# Patient Record
Sex: FEMALE | Race: WHITE | NOT HISPANIC OR LATINO | URBAN - METROPOLITAN AREA
[De-identification: names, ages, dates, MRNs, and addresses within clinical notes are randomized per-mention and may not be internally consistent; named-entity substitution may affect disease eponyms.]

---

## 2021-05-20 ENCOUNTER — INPATIENT (INPATIENT)
Facility: HOSPITAL | Age: 26
LOS: 1 days | Discharge: HOME | End: 2021-05-22
Attending: OBSTETRICS & GYNECOLOGY | Admitting: OBSTETRICS & GYNECOLOGY
Payer: MEDICAID

## 2021-05-20 VITALS
TEMPERATURE: 96 F | SYSTOLIC BLOOD PRESSURE: 138 MMHG | OXYGEN SATURATION: 100 % | DIASTOLIC BLOOD PRESSURE: 78 MMHG | RESPIRATION RATE: 18 BRPM | WEIGHT: 179.9 LBS | HEART RATE: 96 BPM

## 2021-05-20 LAB
ALBUMIN SERPL ELPH-MCNC: 4.4 G/DL — SIGNIFICANT CHANGE UP (ref 3.5–5.2)
ALP SERPL-CCNC: 91 U/L — SIGNIFICANT CHANGE UP (ref 30–115)
ALT FLD-CCNC: 11 U/L — SIGNIFICANT CHANGE UP (ref 0–41)
ANION GAP SERPL CALC-SCNC: 11 MMOL/L — SIGNIFICANT CHANGE UP (ref 7–14)
APPEARANCE UR: CLEAR — SIGNIFICANT CHANGE UP
AST SERPL-CCNC: 13 U/L — SIGNIFICANT CHANGE UP (ref 0–41)
BASOPHILS # BLD AUTO: 0.02 K/UL — SIGNIFICANT CHANGE UP (ref 0–0.2)
BASOPHILS NFR BLD AUTO: 0.2 % — SIGNIFICANT CHANGE UP (ref 0–1)
BILIRUB SERPL-MCNC: 0.3 MG/DL — SIGNIFICANT CHANGE UP (ref 0.2–1.2)
BILIRUB UR-MCNC: NEGATIVE — SIGNIFICANT CHANGE UP
BUN SERPL-MCNC: 8 MG/DL — LOW (ref 10–20)
CALCIUM SERPL-MCNC: 9.2 MG/DL — SIGNIFICANT CHANGE UP (ref 8.5–10.1)
CHLORIDE SERPL-SCNC: 103 MMOL/L — SIGNIFICANT CHANGE UP (ref 98–110)
CO2 SERPL-SCNC: 24 MMOL/L — SIGNIFICANT CHANGE UP (ref 17–32)
COLOR SPEC: SIGNIFICANT CHANGE UP
CREAT SERPL-MCNC: 0.7 MG/DL — SIGNIFICANT CHANGE UP (ref 0.7–1.5)
DIFF PNL FLD: NEGATIVE — SIGNIFICANT CHANGE UP
EOSINOPHIL # BLD AUTO: 0.01 K/UL — SIGNIFICANT CHANGE UP (ref 0–0.7)
EOSINOPHIL NFR BLD AUTO: 0.1 % — SIGNIFICANT CHANGE UP (ref 0–8)
GLUCOSE SERPL-MCNC: 117 MG/DL — HIGH (ref 70–99)
GLUCOSE UR QL: NEGATIVE — SIGNIFICANT CHANGE UP
HCT VFR BLD CALC: 33.2 % — LOW (ref 37–47)
HGB BLD-MCNC: 10.3 G/DL — LOW (ref 12–16)
IMM GRANULOCYTES NFR BLD AUTO: 0.3 % — SIGNIFICANT CHANGE UP (ref 0.1–0.3)
KETONES UR-MCNC: ABNORMAL
LEUKOCYTE ESTERASE UR-ACNC: NEGATIVE — SIGNIFICANT CHANGE UP
LIDOCAIN IGE QN: 26 U/L — SIGNIFICANT CHANGE UP (ref 7–60)
LYMPHOCYTES # BLD AUTO: 1.4 K/UL — SIGNIFICANT CHANGE UP (ref 1.2–3.4)
LYMPHOCYTES # BLD AUTO: 15.4 % — LOW (ref 20.5–51.1)
MCHC RBC-ENTMCNC: 23 PG — LOW (ref 27–31)
MCHC RBC-ENTMCNC: 31 G/DL — LOW (ref 32–37)
MCV RBC AUTO: 74.1 FL — LOW (ref 81–99)
MONOCYTES # BLD AUTO: 0.35 K/UL — SIGNIFICANT CHANGE UP (ref 0.1–0.6)
MONOCYTES NFR BLD AUTO: 3.9 % — SIGNIFICANT CHANGE UP (ref 1.7–9.3)
NEUTROPHILS # BLD AUTO: 7.28 K/UL — HIGH (ref 1.4–6.5)
NEUTROPHILS NFR BLD AUTO: 80.1 % — HIGH (ref 42.2–75.2)
NITRITE UR-MCNC: NEGATIVE — SIGNIFICANT CHANGE UP
NRBC # BLD: 0 /100 WBCS — SIGNIFICANT CHANGE UP (ref 0–0)
PH UR: 6.5 — SIGNIFICANT CHANGE UP (ref 5–8)
PLATELET # BLD AUTO: 197 K/UL — SIGNIFICANT CHANGE UP (ref 130–400)
POTASSIUM SERPL-MCNC: 4.5 MMOL/L — SIGNIFICANT CHANGE UP (ref 3.5–5)
POTASSIUM SERPL-SCNC: 4.5 MMOL/L — SIGNIFICANT CHANGE UP (ref 3.5–5)
PROT SERPL-MCNC: 6.9 G/DL — SIGNIFICANT CHANGE UP (ref 6–8)
PROT UR-MCNC: SIGNIFICANT CHANGE UP
RBC # BLD: 4.48 M/UL — SIGNIFICANT CHANGE UP (ref 4.2–5.4)
RBC # FLD: 16.8 % — HIGH (ref 11.5–14.5)
SODIUM SERPL-SCNC: 138 MMOL/L — SIGNIFICANT CHANGE UP (ref 135–146)
SP GR SPEC: 1.02 — SIGNIFICANT CHANGE UP (ref 1.01–1.03)
UROBILINOGEN FLD QL: SIGNIFICANT CHANGE UP
WBC # BLD: 9.09 K/UL — SIGNIFICANT CHANGE UP (ref 4.8–10.8)
WBC # FLD AUTO: 9.09 K/UL — SIGNIFICANT CHANGE UP (ref 4.8–10.8)

## 2021-05-20 PROCEDURE — 99285 EMERGENCY DEPT VISIT HI MDM: CPT

## 2021-05-20 RX ORDER — IOHEXOL 300 MG/ML
30 INJECTION, SOLUTION INTRAVENOUS ONCE
Refills: 0 | Status: COMPLETED | OUTPATIENT
Start: 2021-05-20 | End: 2021-05-20

## 2021-05-20 RX ADMIN — IOHEXOL 30 MILLILITER(S): 300 INJECTION, SOLUTION INTRAVENOUS at 21:03

## 2021-05-20 NOTE — ED PROVIDER NOTE - PROGRESS NOTE DETAILS
After discussion CT results with Dr. Hammond, US ordered and GYN consult called, Pt evaluated by GYN and US results appreciated. GYN attending Dr. Agustin at bedside with pt and mother for evaluation discussion options at this time. Will follow. Pt in NAD.

## 2021-05-20 NOTE — ED PROVIDER NOTE - CLINICAL SUMMARY MEDICAL DECISION MAKING FREE TEXT BOX
pt evaluated for lower abdominal discomfort, pt CT A/P with large right ovarian cyst, US confirms about 13 cm with ? vascular flow. pt evaluated by GYN, recommend admission to GYN service for intervention under Dr. Agustin

## 2021-05-20 NOTE — ED ADULT TRIAGE NOTE - CHIEF COMPLAINT QUOTE
pt came to ED for right sided abdominal pain that began today after Armond. as per mom, pt gets constipated easily. denies N/V

## 2021-05-20 NOTE — ED PROVIDER NOTE - PHYSICAL EXAMINATION
VITAL SIGNS: noted  CONSTITUTIONAL: Well-developed; well-nourished; in no acute distress  HEAD: Normocephalic; atraumatic  EYES: PERRL, EOM intact; conjunctiva and sclera clear  ENT: No nasal discharge; airway clear. MMM  NECK: Supple; non tender.    CARD: S1, S2 normal; no murmurs, gallops, or rubs. Regular rate and rhythm  RESP: CTAB/L, no wheezes, rales or rhonchi  ABD: Normal bowel sounds; soft; non-distended; +lower abdominal tenderness, no rebound or guarding, no CVA tenderness  EXT: Normal ROM. No calf tenderness or edema. Distal pulses intact  NEURO: Alert, oriented. Grossly unremarkable. No focal deficits  SKIN: Skin exam is warm and dry, no acute rash  MS: No midline spinal tenderness

## 2021-05-20 NOTE — ED PROVIDER NOTE - OBJECTIVE STATEMENT
24 yo female with PMH intellectual disability BIB mother c/o RLQ abdominal pain. Pt came home from school and had pain, mom states she was doing kathrin earlier today. No falls or trauma. Pt urinating as usual; no V/D, fevers or change in appetite. Pt not sexually active. Pt is limited historian.

## 2021-05-20 NOTE — ED ADULT TRIAGE NOTE - MEANS OF ARRIVAL
----- Message from Holly Levin sent at 8/1/2017 12:44 PM CDT -----  Contact: Alejandro   [phone] option [phone] ext [phone]  [place]   -   Research is needed on the pt insurance in order to receive the VGO   Pt starting new therapy on August 2  .  Direction are needed   Pt  Call number  [phone]  Thanks,  
Called and spoke with Alejandro at Research as requested.  She explains that, she is waiting to hear back to see if through his Medicare Part D his Vgo may be covered.  Alejandro explains that, she may not know until 24-48 hours of the results.  She will call the patient and Tiffanie Wilhelm NP to let know of the results.  I explained to Alejandro that patient will have a sample kit for tomorrow with the Diabetes Educator Lisa Feng.  Alejandro verbalizes understanding.   
ambulatory

## 2021-05-21 ENCOUNTER — RESULT REVIEW (OUTPATIENT)
Age: 26
End: 2021-05-21

## 2021-05-21 DIAGNOSIS — Z90.89 ACQUIRED ABSENCE OF OTHER ORGANS: Chronic | ICD-10-CM

## 2021-05-21 LAB
ABO RH CONFIRMATION: SIGNIFICANT CHANGE UP
ANION GAP SERPL CALC-SCNC: 10 MMOL/L — SIGNIFICANT CHANGE UP (ref 7–14)
BASOPHILS # BLD AUTO: 0.01 K/UL — SIGNIFICANT CHANGE UP (ref 0–0.2)
BASOPHILS NFR BLD AUTO: 0.1 % — SIGNIFICANT CHANGE UP (ref 0–1)
BLD GP AB SCN SERPL QL: SIGNIFICANT CHANGE UP
BUN SERPL-MCNC: 6 MG/DL — LOW (ref 10–20)
CALCIUM SERPL-MCNC: 8.9 MG/DL — SIGNIFICANT CHANGE UP (ref 8.5–10.1)
CHLORIDE SERPL-SCNC: 104 MMOL/L — SIGNIFICANT CHANGE UP (ref 98–110)
CO2 SERPL-SCNC: 24 MMOL/L — SIGNIFICANT CHANGE UP (ref 17–32)
CREAT SERPL-MCNC: 0.6 MG/DL — LOW (ref 0.7–1.5)
EOSINOPHIL # BLD AUTO: 0 K/UL — SIGNIFICANT CHANGE UP (ref 0–0.7)
EOSINOPHIL NFR BLD AUTO: 0 % — SIGNIFICANT CHANGE UP (ref 0–8)
GLUCOSE SERPL-MCNC: 122 MG/DL — HIGH (ref 70–99)
HCT VFR BLD CALC: 32.1 % — LOW (ref 37–47)
HGB BLD-MCNC: 10.1 G/DL — LOW (ref 12–16)
IMM GRANULOCYTES NFR BLD AUTO: 0.5 % — HIGH (ref 0.1–0.3)
LDH SERPL L TO P-CCNC: 245 — HIGH (ref 50–242)
LYMPHOCYTES # BLD AUTO: 1 K/UL — LOW (ref 1.2–3.4)
LYMPHOCYTES # BLD AUTO: 9.2 % — LOW (ref 20.5–51.1)
MAGNESIUM SERPL-MCNC: 1.9 MG/DL — SIGNIFICANT CHANGE UP (ref 1.8–2.4)
MCHC RBC-ENTMCNC: 22.8 PG — LOW (ref 27–31)
MCHC RBC-ENTMCNC: 31.5 G/DL — LOW (ref 32–37)
MCV RBC AUTO: 72.5 FL — LOW (ref 81–99)
MONOCYTES # BLD AUTO: 0.54 K/UL — SIGNIFICANT CHANGE UP (ref 0.1–0.6)
MONOCYTES NFR BLD AUTO: 5 % — SIGNIFICANT CHANGE UP (ref 1.7–9.3)
NEUTROPHILS # BLD AUTO: 9.28 K/UL — HIGH (ref 1.4–6.5)
NEUTROPHILS NFR BLD AUTO: 85.2 % — HIGH (ref 42.2–75.2)
NRBC # BLD: 0 /100 WBCS — SIGNIFICANT CHANGE UP (ref 0–0)
PHOSPHATE SERPL-MCNC: 2.8 MG/DL — SIGNIFICANT CHANGE UP (ref 2.1–4.9)
PLATELET # BLD AUTO: 188 K/UL — SIGNIFICANT CHANGE UP (ref 130–400)
POTASSIUM SERPL-MCNC: 4.5 MMOL/L — SIGNIFICANT CHANGE UP (ref 3.5–5)
POTASSIUM SERPL-SCNC: 4.5 MMOL/L — SIGNIFICANT CHANGE UP (ref 3.5–5)
RBC # BLD: 4.43 M/UL — SIGNIFICANT CHANGE UP (ref 4.2–5.4)
RBC # FLD: 16.9 % — HIGH (ref 11.5–14.5)
SARS-COV-2 RNA SPEC QL NAA+PROBE: SIGNIFICANT CHANGE UP
SODIUM SERPL-SCNC: 138 MMOL/L — SIGNIFICANT CHANGE UP (ref 135–146)
WBC # BLD: 10.88 K/UL — HIGH (ref 4.8–10.8)
WBC # FLD AUTO: 10.88 K/UL — HIGH (ref 4.8–10.8)

## 2021-05-21 PROCEDURE — 74177 CT ABD & PELVIS W/CONTRAST: CPT | Mod: 26,MA

## 2021-05-21 PROCEDURE — 88305 TISSUE EXAM BY PATHOLOGIST: CPT | Mod: 26

## 2021-05-21 PROCEDURE — 88342 IMHCHEM/IMCYTCHM 1ST ANTB: CPT | Mod: 26

## 2021-05-21 PROCEDURE — 58662 LAPAROSCOPY EXCISE LESIONS: CPT

## 2021-05-21 PROCEDURE — 99222 1ST HOSP IP/OBS MODERATE 55: CPT

## 2021-05-21 PROCEDURE — 88112 CYTOPATH CELL ENHANCE TECH: CPT | Mod: 26

## 2021-05-21 PROCEDURE — 88341 IMHCHEM/IMCYTCHM EA ADD ANTB: CPT | Mod: 26

## 2021-05-21 PROCEDURE — 76856 US EXAM PELVIC COMPLETE: CPT | Mod: 26

## 2021-05-21 RX ORDER — ACETAMINOPHEN 500 MG
975 TABLET ORAL EVERY 8 HOURS
Refills: 0 | Status: DISCONTINUED | OUTPATIENT
Start: 2021-05-21 | End: 2021-05-22

## 2021-05-21 RX ORDER — CEFAZOLIN SODIUM 1 G
2000 VIAL (EA) INJECTION EVERY 8 HOURS
Refills: 0 | Status: COMPLETED | OUTPATIENT
Start: 2021-05-21 | End: 2021-05-22

## 2021-05-21 RX ORDER — IBUPROFEN 200 MG
600 TABLET ORAL EVERY 6 HOURS
Refills: 0 | Status: DISCONTINUED | OUTPATIENT
Start: 2021-05-21 | End: 2021-05-22

## 2021-05-21 RX ORDER — HYDROMORPHONE HYDROCHLORIDE 2 MG/ML
1 INJECTION INTRAMUSCULAR; INTRAVENOUS; SUBCUTANEOUS
Refills: 0 | Status: DISCONTINUED | OUTPATIENT
Start: 2021-05-21 | End: 2021-05-21

## 2021-05-21 RX ORDER — ONDANSETRON 8 MG/1
4 TABLET, FILM COATED ORAL EVERY 4 HOURS
Refills: 0 | Status: DISCONTINUED | OUTPATIENT
Start: 2021-05-21 | End: 2021-05-22

## 2021-05-21 RX ORDER — HYDROMORPHONE HYDROCHLORIDE 2 MG/ML
0.5 INJECTION INTRAMUSCULAR; INTRAVENOUS; SUBCUTANEOUS
Refills: 0 | Status: DISCONTINUED | OUTPATIENT
Start: 2021-05-21 | End: 2021-05-21

## 2021-05-21 RX ORDER — SIMETHICONE 80 MG/1
80 TABLET, CHEWABLE ORAL EVERY 4 HOURS
Refills: 0 | Status: DISCONTINUED | OUTPATIENT
Start: 2021-05-21 | End: 2021-05-22

## 2021-05-21 RX ORDER — SODIUM CHLORIDE 9 MG/ML
1000 INJECTION, SOLUTION INTRAVENOUS
Refills: 0 | Status: DISCONTINUED | OUTPATIENT
Start: 2021-05-21 | End: 2021-05-21

## 2021-05-21 RX ORDER — ACETAMINOPHEN 500 MG
650 TABLET ORAL ONCE
Refills: 0 | Status: COMPLETED | OUTPATIENT
Start: 2021-05-21 | End: 2021-05-21

## 2021-05-21 RX ORDER — OXYCODONE HYDROCHLORIDE 5 MG/1
10 TABLET ORAL EVERY 4 HOURS
Refills: 0 | Status: DISCONTINUED | OUTPATIENT
Start: 2021-05-21 | End: 2021-05-22

## 2021-05-21 RX ADMIN — Medication 100 MILLIGRAM(S): at 20:54

## 2021-05-21 RX ADMIN — Medication 600 MILLIGRAM(S): at 23:05

## 2021-05-21 NOTE — PROGRESS NOTE ADULT - SUBJECTIVE AND OBJECTIVE BOX
PGY 2 Note    Patient examined at bedside, pain well controlled on PO medications.  Denies fevers/chills, HA/N/V, CP/SOB/palpitations, hematuria/dysuria, constipation/diarrhea. Tolerating regular diet, passing no flatus. Not Ambulating. Using incentive spirometry.     T(F): 98.3 (05-21-21 @ 20:00), Max: 98.7 (05-21-21 @ 14:45)  HR: 83 (05-21-21 @ 21:00) (73 - 84)  BP: 120/69 (05-21-21 @ 21:00) (98/52 - 138/65)  RR: 20 (05-21-21 @ 21:00) (14 - 22)  SpO2: 98% (05-21-21 @ 21:00) (97% - 100%)    I&O's Summary    21 May 2021 07:01  -  21 May 2021 21:07  --------------------------------------------------------  IN: 525 mL / OUT: 350 mL / NET: 175 mL      Urine:   05-21-21 @ 07:01  -  05-21-21 @ 21:07  --------------------------------------------------------  IN: 0 mL / OUT: 350 mL / NET: -350 mL    UO: 130cc clear (1800-200), adequate    Physical Exam:  General: AAOx3. NAD  CVS: RRR. Nl S1S2  Lungs: CTAB  Abdomen: soft, non-tender, non-distended, +BSx4  Incision: laparoscopic surgical dressings C/D/I, no erythema, no draining  VE: deferred, no bleeding on pad/chux  Ext: No edema. SCDs in place    Labs:             10.3<L>  9.09  )-----------( 197      ( 05-20 @ 20:47 )             33.2<L>     05-20    138  |  103  |  8<L>  ----------------------------<  117<H>  4.5   |  24  |  0.7    Ca    9.2      20 May 2021 20:47    TPro  6.9  /  Alb  4.4  /  TBili  0.3  /  DBili  x   /  AST  13  /  ALT  11  /  AlkPhos  91  05-20        Trend:             10.3<L>  9.09  )-----------( 197      ( 05-20 @ 20:47 )             33.2<L>      Creatinine, Serum: 0.7 (05-20)      Medications:  MEDICATIONS  (STANDING):  acetaminophen   Tablet .. 975 milliGRAM(s) Oral every 8 hours  ceFAZolin   IVPB 2000 milliGRAM(s) IV Intermittent every 8 hours  ibuprofen  Tablet. 600 milliGRAM(s) Oral every 6 hours  simethicone 80 milliGRAM(s) Chew every 4 hours    MEDICATIONS  (PRN):  HYDROmorphone  Injectable 0.5 milliGRAM(s) IV Push every 10 minutes PRN Moderate Pain (4 - 6)  HYDROmorphone  Injectable 1 milliGRAM(s) IV Push every 10 minutes PRN Severe Pain (7 - 10)  ondansetron Injectable 4 milliGRAM(s) IV Push every 4 hours PRN Nausea and/or Vomiting  oxyCODONE    IR 10 milliGRAM(s) Oral every 4 hours PRN Severe Pain (7 - 10)             PGY 2 Note    Patient examined at bedside, pain well controlled on PO medications.  Denies fevers/chills, HA/N/V, CP/SOB/palpitations, hematuria/dysuria, constipation/diarrhea. Tolerating regular diet, passing no flatus. Not Ambulating. Using incentive spirometry.     T(F): 98.3 (05-21-21 @ 20:00), Max: 98.7 (05-21-21 @ 14:45)  HR: 83 (05-21-21 @ 21:00) (73 - 84)  BP: 120/69 (05-21-21 @ 21:00) (98/52 - 138/65)  RR: 20 (05-21-21 @ 21:00) (14 - 22)  SpO2: 98% (05-21-21 @ 21:00) (97% - 100%)    I&O's Summary    21 May 2021 07:01  -  21 May 2021 21:07  --------------------------------------------------------  IN: 525 mL / OUT: 350 mL / NET: 175 mL      Urine:   05-21-21 @ 07:01  -  05-21-21 @ 21:07  --------------------------------------------------------  IN: 0 mL / OUT: 350 mL / NET: -350 mL    UO: 130cc clear (7449-9776), adequate    Physical Exam:  General: AAOx3. NAD  CVS: RRR. Nl S1S2  Lungs: CTAB  Abdomen: soft, non-tender, non-distended, +BSx4  Incision: laparoscopic surgical dressings C/D/I, no erythema, no draining  VE: deferred, no bleeding on pad/chux  Ext: No edema. SCDs in place    Labs:             10.3<L>  9.09  )-----------( 197      ( 05-20 @ 20:47 )             33.2<L>     05-20    138  |  103  |  8<L>  ----------------------------<  117<H>  4.5   |  24  |  0.7    Ca    9.2      20 May 2021 20:47    TPro  6.9  /  Alb  4.4  /  TBili  0.3  /  DBili  x   /  AST  13  /  ALT  11  /  AlkPhos  91  05-20        Trend:             10.3<L>  9.09  )-----------( 197      ( 05-20 @ 20:47 )             33.2<L>      Creatinine, Serum: 0.7 (05-20)      Medications:  MEDICATIONS  (STANDING):  acetaminophen   Tablet .. 975 milliGRAM(s) Oral every 8 hours  ceFAZolin   IVPB 2000 milliGRAM(s) IV Intermittent every 8 hours  ibuprofen  Tablet. 600 milliGRAM(s) Oral every 6 hours  simethicone 80 milliGRAM(s) Chew every 4 hours    MEDICATIONS  (PRN):  HYDROmorphone  Injectable 0.5 milliGRAM(s) IV Push every 10 minutes PRN Moderate Pain (4 - 6)  HYDROmorphone  Injectable 1 milliGRAM(s) IV Push every 10 minutes PRN Severe Pain (7 - 10)  ondansetron Injectable 4 milliGRAM(s) IV Push every 4 hours PRN Nausea and/or Vomiting  oxyCODONE    IR 10 milliGRAM(s) Oral every 4 hours PRN Severe Pain (7 - 10)

## 2021-05-21 NOTE — H&P ADULT - NSHPLABSRESULTS_GEN_ALL_CORE
LABS:                      10.3   9.09  )-----------( 197      ( 20 May 2021 20:47 )             33.2     05    138  |  103  |  8<L>  ----------------------------<  117<H>  4.5   |  24  |  0.7    Ca    9.2      20 May 2021 20:47    TPro  6.9  /  Alb  4.4  /  TBili  0.3  /  DBili  x   /  AST  13  /  ALT  11  /  AlkPhos  91  20    Urinalysis Basic - ( 20 May 2021 20:47 )  Color: Light Yellow / Appearance: Clear / S.025 / pH: x  Gluc: x / Ketone: Moderate  / Bili: Negative / Urobili: <2 mg/dL   Blood: x / Protein: Trace / Nitrite: Negative   Leuk Esterase: Negative / RBC: x / WBC x   Sq Epi: x / Non Sq Epi: x / Bacteria: x    < from: CT Abdomen and Pelvis w/ Oral Cont and w/ IV Cont (21 @ 00:05) >  EXAM:  CT ABDOMEN AND PELVIS OC IC        PROCEDURE DATE:  2021    INTERPRETATION:  CLINICAL STATEMENT: Right lower quadrant abdominal pain.  TECHNIQUE: Contiguous axial CT images were obtained from the lower chest to the pubic symphysis following administration of intravenous contrast.  Oral contrast was administered.  Reformatted images in the coronal and sagittal planes were acquired.  COMPARISON CT: None.  OTHER STUDIES USED FOR CORRELATION: None.  FINDINGS:  LOWER CHEST: Unremarkable.  HEPATOBILIARY: Right hepatic lobe 1.8 x 2.2 cm hypodensity, incompletely characterized. Otherwise unremarkable.  SPLEEN: Unremarkable.  PANCREAS: Unremarkable.  ADRENAL GLANDS: Unremarkable.  KIDNEYS: Bilateral symmetric renal enhancement. No hydronephrosis  ABDOMINOPELVIC NODES: Unremarkable.  PELVIC ORGANS: Right lower quadrant cystic lesion of likely ovarian origin. The lesion is located cephalad to the urinary bladder. Lesion measures 13.0 x 8.0 x 1.0 cm. Additional partially calcified 5.8 cm ovoid lesion contiguous with the right uterine body.  PERITONEUM/MESENTERY/BOWEL: No evidence of bowel obstruction, pneumoperitoneum or ascites. Appendix is unremarkable.  BONES/SOFT TISSUES: Unremarkable.  IMPRESSION:  Right lower quadrant 13 cm cystic lesion of likely ovarian origin. Differential diagnosis includes ovarian torsion and cystic neoplasm. Additional partially calcified 5.8 cm ovoid lesion contiguous with the right uterine body, differential diagnosis includes exophytic uterine leiomyoma vs. ovarian teratoma. Consider pelvic MRI evaluation.  Right hepatic 2.2 cm hypodensity, 35 Hounsfield units, incompletely characterized. Consider MRI evaluation.  Dr. Hammond discussed attending impression of the case with Dr. Hilario 2021 at 12:35 AM.    < from: US Pelvis Complete (21 @ 01:41) >  EXAM:  US PELVIC COMPLETE        PROCEDURE DATE:  2021    INTERPRETATION:  CLINICAL INFORMATION: Right ovarian cyst.  LMP: 2021  COMPARISON: None available.  TECHNIQUE:  Transabdominal pelvic sonogram only. Color and Spectral Doppler was performed.  FINDINGS:  Uterus: 10.2 cm x 4.1 cm x 5.2 cm. At the right aspect of the uterus there is a partially calcified solid lesion measuring 5.0 x 4.7 x 4.4 cm,  Endometrium: 4 mm. Within normal limits.  Right ovarian cyst measures 13.1 x 11.6 x 8.6 cm. Questionable peripheral right ovarian vascular flow.  Left ovary is not delineated.  Fluid: None.  IMPRESSION:  Right ovarian cyst measures 13.1 x 11.6 x 8.6 cm. Questionable peripheral right ovarian vascular flow.  At the right aspect of the uterus there is a partially calcified solid lesion measuring 5.0 x 4.7 x 4.4 cm, favoring pedunculated exophytic uterine fibroid.

## 2021-05-21 NOTE — PRE-ANESTHESIA EVALUATION ADULT - NSANTHOSAYNRD_GEN_A_CORE
No. DAVDI screening performed.  STOP BANG Legend: 0-2 = LOW Risk; 3-4 = INTERMEDIATE Risk; 5-8 = HIGH Risk

## 2021-05-21 NOTE — BRIEF OPERATIVE NOTE - OPERATION/FINDINGS
Normal external genitalia, vagina, and cervix  Normal sized anteverted uterus  On laparoscopy, approximately 13cm simple appearing right ovarian cyst filled with clear fluid.  Normal right fallopian tube, not torsed  Left fallopian tube and ovary absent, likely due to previous torsion. 7cm dermoid cyst found in posterior cul-de-sac, mostly free-floating with some adhesions to bowel.   Peritoneal adhesions to left anterior abdominal wall  Normal liver Normal external genitalia.   Normal sized anteverted uterus  On laparoscopy, approximately 13cm simple appearing right ovarian cyst filled with citrine fluid.  Normal right fallopian tube. Right adnexa not torsed.   Left fallopian tube and ovary absent, adhesions of omentum around umbilical area and in the left pelvic side wall.  7cm dermoid cyst found in posterior cul-de-sac, attached to pelvic wall and sigmoid colon/rectum - likely due to old torsion of left adnexa.     Peritoneal adhesions to left anterior abdominal wall.  Normal liver/gallbladder.

## 2021-05-21 NOTE — CONSULT NOTE ADULT - SUBJECTIVE AND OBJECTIVE BOX
History obtained from patient and her mother (guardian)    Chief Complaint: right sided abdominal pain    HPI: 24yo G0, virginal, h/o intellectual delay, LMP: 21 presents to the ED with right sided abdominal pain that started at 1500. Patient was exercising and working and had acute onset of the right sided abdominal pain. Described as aching in nature, nonradiating. No remitting factors. Pressure on the area is the only exacerbating factor. Reports some mild discomfort with urination. Denies frequency or urgency. Patient has known h/o constipation, likely functional constipation, last BM today. Denies fever, chills, nausea, vomiting, diarrhea, dysuria, abnormal vaginal discharge. Patient has only seen a GYN once, several years ago. Has never had a pelvic exam or pap smear.     Last ate:  @1200  Last BM: this afternoon    Ob/Gyn History:  G0, virginal                 LMP - 21               Cycle Length - q28d  Denies history of ovarian cysts, uterine fibroids, abnormal paps, or STIs  Last Pap Smear - never done    OBHx: nulligravid    Denies the following: constitutional symptoms, visual symptoms, cardiovascular symptoms, respiratory symptoms, GI symptoms, musculoskeletal symptoms, skin symptoms, neurologic symptoms, hematologic symptoms, allergic symptoms, psychiatric symptoms  Except any pertinent positives listed.     PAST MEDICAL & SURGICAL HISTORY:  Medical: Intellectual delay, diagnosed at 4yo  Surgical: h/o tonsillectomy    FAMILY HISTORY: none    SOCIAL HISTORY: Denies cigarette use, alcohol use, or illicit drug use    Home Medications: none    Allergies: No Known Allergies, No Intolerances    Vital Signs Last 24 Hrs  T(F): 96.8 (21 May 2021 01:47), Max: 96.8 (21 May 2021 01:47)  HR: 82 (21 May 2021 01:47) (82 - 96)  BP: 100/52 (21 May 2021 01:47) (100/52 - 138/78)  RR: 18 (21 May 2021 01:47) (18 - 18)    Weight (kg): 81.6 (21 @ 19:46)    General Appearance - AAOx3, NAD  Heart - S1S2 regular rate and rhythm  Lung - CTA Bilaterally  Abdomen - Soft, nontender, nondistended, no rebound, no rigidity, no guarding, bowel sounds present    GYN/Pelvis:    Labia Majora - Normal  Labia Minora - Normal  Clitoris - Normal  Urethra - Normal  Vagina - Normal  Cervix - Normal    Uterus:  Size - Normal  Tenderness - None  Mass - None  Freely mobile    Adnexa:  Masses - None  Tenderness - None      Meds:   iohexol 300 mG (iodine)/mL Oral Solution 30 milliLiter(s) Oral once      Weight (kg): 81.6 (21 @ 19:46)    LABS:                        10.3   9.09  )-----------( 197      ( 20 May 2021 20:47 )             33.2             138  |  103  |  8<L>  ----------------------------<  117<H>  4.5   |  24  |  0.7    Ca    9.2      20 May 2021 20:47    TPro  6.9  /  Alb  4.4  /  TBili  0.3  /  DBili  x   /  AST  13  /  ALT  11  /  AlkPhos  91        Urinalysis Basic - ( 20 May 2021 20:47 )    Color: Light Yellow / Appearance: Clear / S.025 / pH: x  Gluc: x / Ketone: Moderate  / Bili: Negative / Urobili: <2 mg/dL   Blood: x / Protein: Trace / Nitrite: Negative   Leuk Esterase: Negative / RBC: x / WBC x   Sq Epi: x / Non Sq Epi: x / Bacteria: x      RADIOLOGY & ADDITIONAL STUDIES:  < from: CT Abdomen and Pelvis w/ Oral Cont and w/ IV Cont (21 @ 00:05) >    EXAM:  CT ABDOMEN AND PELVIS OC IC            PROCEDURE DATE:  2021            INTERPRETATION:  CLINICAL STATEMENT: Right lower quadrant abdominal pain.    TECHNIQUE: Contiguous axial CT images were obtained from the lower chest to the pubic symphysis following administration of intravenous contrast.  Oral contrast was administered.  Reformatted images in the coronal and sagittal planes were acquired.    COMPARISON CT: None.    OTHER STUDIES USED FOR CORRELATION: None.      FINDINGS:    LOWER CHEST: Unremarkable.    HEPATOBILIARY: Right hepatic lobe 1.8 x 2.2 cm hypodensity, incompletely characterized. Otherwise unremarkable.    SPLEEN: Unremarkable.    PANCREAS: Unremarkable.    ADRENAL GLANDS: Unremarkable.    KIDNEYS: Bilateral symmetric renal enhancement. No hydronephrosis    ABDOMINOPELVIC NODES: Unremarkable.    PELVIC ORGANS: Right lower quadrant cystic lesion of likely ovarian origin. The lesion is located cephalad to the urinary bladder. Lesion measures 13.0 x 8.0 x 1.0 cm. Additional partially calcified 5.8 cm ovoid lesion contiguous with the right uterine body.    PERITONEUM/MESENTERY/BOWEL: No evidence of bowel obstruction, pneumoperitoneum or ascites. Appendix is unremarkable.    BONES/SOFT TISSUES: Unremarkable.      IMPRESSION:    Right lower quadrant 13 cm cystic lesion of likely ovarian origin. Differential diagnosis includes ovarian torsion and cystic neoplasm. Additional partially calcified 5.8 cm ovoid lesion contiguous with the right uterine body, differential diagnosis includes exophytic uterine leiomyoma vs. ovarian teratoma. Consider pelvic MRI evaluation.    Right hepatic 2.2 cm hypodensity, 35 Hounsfield units, incompletely characterized. Consider MRI evaluation.    Dr. Hammond discussed attending impression of the case with Dr. Hilario 2021 at 12:35 AM.    < end of copied text >    F/u TVUS History obtained from patient and her mother (guardian)    Chief Complaint: right sided abdominal pain    HPI: 26yo G0, virginal, h/o intellectual delay, LMP: 21 presents to the ED with right sided abdominal pain that started at 1500. Patient was exercising and working and had acute onset of the right sided abdominal pain. Described as aching in nature, nonradiating. No remitting factors. Pressure on the area is the only exacerbating factor. Reports some mild discomfort with urination. Denies frequency or urgency. Patient has known h/o constipation, likely functional constipation, last BM today. Denies fever, chills, nausea, vomiting, diarrhea, dysuria, abnormal vaginal discharge. Patient has only seen a GYN once, several years ago. Has never had a pelvic exam or pap smear.     Last ate:  @1200  Last BM: this afternoon    Ob/Gyn History:  G0, virginal                 LMP - 21               Cycle Length - q28d  Denies history of ovarian cysts, uterine fibroids, abnormal paps, or STIs  Last Pap Smear - never done    OBHx: nulligravid    Denies the following: constitutional symptoms, visual symptoms, cardiovascular symptoms, respiratory symptoms, GI symptoms, musculoskeletal symptoms, skin symptoms, neurologic symptoms, hematologic symptoms, allergic symptoms, psychiatric symptoms  Except any pertinent positives listed.     PAST MEDICAL & SURGICAL HISTORY:  Medical: Intellectual delay, diagnosed at 2yo  Surgical: h/o tonsillectomy    FAMILY HISTORY: none    SOCIAL HISTORY: Denies cigarette use, alcohol use, or illicit drug use    Home Medications: none    Allergies: No Known Allergies, No Intolerances    Vital Signs Last 24 Hrs  T(F): 96.8 (21 May 2021 01:47), Max: 96.8 (21 May 2021 01:47)  HR: 82 (21 May 2021 01:47) (82 - 96)  BP: 100/52 (21 May 2021 01:47) (100/52 - 138/78)  RR: 18 (21 May 2021 01:47) (18 - 18)    Weight (kg): 81.6 (21 @ 19:46)    General Appearance - AAOx3, NAD  Heart - S1S2 regular rate and rhythm  Lung - CTA Bilaterally  Abdomen - Soft, mild RLQ tenderness to palpation, other quadrants nontender, nondistended, no rebound, no rigidity, no guarding, bowel sounds present      Meds:   iohexol 300 mG (iodine)/mL Oral Solution 30 milliLiter(s) Oral once      Weight (kg): 81.6 (21 @ 19:46)    LABS:                        10.3   9.09  )-----------( 197      ( 20 May 2021 20:47 )             33.2             138  |  103  |  8<L>  ----------------------------<  117<H>  4.5   |  24  |  0.7    Ca    9.2      20 May 2021 20:47    TPro  6.9  /  Alb  4.4  /  TBili  0.3  /  DBili  x   /  AST  13  /  ALT  11  /  AlkPhos  91        Urinalysis Basic - ( 20 May 2021 20:47 )    Color: Light Yellow / Appearance: Clear / S.025 / pH: x  Gluc: x / Ketone: Moderate  / Bili: Negative / Urobili: <2 mg/dL   Blood: x / Protein: Trace / Nitrite: Negative   Leuk Esterase: Negative / RBC: x / WBC x   Sq Epi: x / Non Sq Epi: x / Bacteria: x      RADIOLOGY & ADDITIONAL STUDIES:  < from: CT Abdomen and Pelvis w/ Oral Cont and w/ IV Cont (21 @ 00:05) >    EXAM:  CT ABDOMEN AND PELVIS OC IC            PROCEDURE DATE:  2021            INTERPRETATION:  CLINICAL STATEMENT: Right lower quadrant abdominal pain.    TECHNIQUE: Contiguous axial CT images were obtained from the lower chest to the pubic symphysis following administration of intravenous contrast.  Oral contrast was administered.  Reformatted images in the coronal and sagittal planes were acquired.    COMPARISON CT: None.    OTHER STUDIES USED FOR CORRELATION: None.      FINDINGS:    LOWER CHEST: Unremarkable.    HEPATOBILIARY: Right hepatic lobe 1.8 x 2.2 cm hypodensity, incompletely characterized. Otherwise unremarkable.    SPLEEN: Unremarkable.    PANCREAS: Unremarkable.    ADRENAL GLANDS: Unremarkable.    KIDNEYS: Bilateral symmetric renal enhancement. No hydronephrosis    ABDOMINOPELVIC NODES: Unremarkable.    PELVIC ORGANS: Right lower quadrant cystic lesion of likely ovarian origin. The lesion is located cephalad to the urinary bladder. Lesion measures 13.0 x 8.0 x 1.0 cm. Additional partially calcified 5.8 cm ovoid lesion contiguous with the right uterine body.    PERITONEUM/MESENTERY/BOWEL: No evidence of bowel obstruction, pneumoperitoneum or ascites. Appendix is unremarkable.    BONES/SOFT TISSUES: Unremarkable.      IMPRESSION:    Right lower quadrant 13 cm cystic lesion of likely ovarian origin. Differential diagnosis includes ovarian torsion and cystic neoplasm. Additional partially calcified 5.8 cm ovoid lesion contiguous with the right uterine body, differential diagnosis includes exophytic uterine leiomyoma vs. ovarian teratoma. Consider pelvic MRI evaluation.    Right hepatic 2.2 cm hypodensity, 35 Hounsfield units, incompletely characterized. Consider MRI evaluation.    Dr. Hammond discussed attending impression of the case with Dr. Hilario 2021 at 12:35 AM.    < end of copied text >    < from: US Pelvis Complete (21 @ 01:41) >  EXAM:  US PELVIC COMPLETE            PROCEDURE DATE:  2021      INTERPRETATION:  CLINICAL INFORMATION: Right ovarian cyst.    LMP: 2021    COMPARISON: None available.    TECHNIQUE:  Transabdominal pelvic sonogram only. Color and Spectral Doppler was performed.    FINDINGS:    Uterus: 10.2 cm x 4.1 cm x 5.2 cm. At the right aspect of the uterus there is a partially calcified solid lesion measuring 5.0 x 4.7 x 4.4 cm,  Endometrium: 4 mm. Within normal limits.    Right ovarian cyst measures 13.1 x 11.6 x 8.6 cm. Questionable peripheral right ovarian vascular flow.    Left ovary is not delineated.    Fluid: None.    IMPRESSION:    Right ovarian cyst measures 13.1 x 11.6 x 8.6 cm. Questionable peripheral right ovarian vascular flow.    At the right aspect of the uterus there is a partially calcified solid lesion measuring 5.0 x 4.7 x 4.4 cm, favoring pedunculated exophytic uterine fibroid.    < end of copied text >

## 2021-05-21 NOTE — BRIEF OPERATIVE NOTE - NSICDXBRIEFPOSTOP_GEN_ALL_CORE_FT
POST-OP DIAGNOSIS:  Dermoid cyst 21-May-2021 17:02:35 Free-floating in pelvis, suspected from left side Avis Webster  Right ovarian cyst 21-May-2021 17:02:51  Avis Webster   POST-OP DIAGNOSIS:  Dermoid cyst 21-May-2021 17:02:35 Attached to rectum and cul-de-sac, suspected old torsion from left adnexa Avis Webster  Right ovarian cyst 21-May-2021 17:02:51  Avis Webster

## 2021-05-21 NOTE — CONSULT NOTE ADULT - ASSESSMENT
26yo G0, virginal, LMP 5/9/21, with right sided abdominal pain, with 11.2cm right ovarian simple cyst, currently clinically and hemodynamically stable.    INCOMPLETE NOTE  26yo G0, virginal, LMP 5/9/21, with right sided abdominal pain now resolved, with 11.2cm right ovarian simple cyst, currently clinically and hemodynamically stable.    Discussed possibility of ovarian torsion-detorsion with the patient and mother. Discussed close follow-up and outpatient management with scheduled ovarian cystectomy vs inpatient management at this time. Risks, benefits and alternatives explained, all questions answered. At this time patient elected for XX.     INCOMPLETE NOTE    Dr. Ley and Dr. Noe aware

## 2021-05-21 NOTE — BRIEF OPERATIVE NOTE - NSICDXBRIEFPREOP_GEN_ALL_CORE_FT
PRE-OP DIAGNOSIS:  Pelvic mass in female 21-May-2021 16:58:04 possible torsion Avis Webster   PRE-OP DIAGNOSIS:  Pelvic mass in female 21-May-2021 16:58:04 possible torsion Avis Webster  Acute pelvic pain 21-May-2021 17:24:03  Marlen Chapa

## 2021-05-21 NOTE — PROGRESS NOTE ADULT - ASSESSMENT
A/P: 26yo G0, virginal, POD#0 s/p laparoscopic right ovarian cystectomy & removal of floating dermoid with an EBL 100cc; currently recovering well  -diet: regular  -DVT ppx: SCDs  -indwelling urethral catheter   -continue IVF hydration   -activity: activity as tolerated   -encourage ambulation  -encourage PO hydration   -encourage incentive spirometry use  -abdominal binder prn           Dr. Noe aware. Dr. Chapa to be made aware

## 2021-05-21 NOTE — CHART NOTE - NSCHARTNOTEFT_GEN_A_CORE
Received signed out from Dr Agustin.   Patient on call to the OR due to large adnexal mass with right sided abdominal pain - r/o torsion.   H&P, images, labs reviewed. Agree with plan.  Patient and mother (guardian) met.   Findings and plan discussed with both - 13cm right simple cyst and 5cm calcified mass in the right - likely pedunculated fibroid.    The procedure "Diagnostic laparoscopy possible right/left ovarian cystectomy, possible right/left salpingo-oophorectomy, possible myomectomy, possible laparotomy and all indicated procedures" was explained to patient/guardian, as well as risks (including but not limited to bleeding/infection/DVT/PE/injury to surrounding structures such as uterus, ovaries, bladder, bowel, ureters, nerves and vessels), benefits and alternatives. Patient/Guardian voiced understanding and guardian signed informed consent. All questions answered.

## 2021-05-21 NOTE — H&P ADULT - ATTENDING COMMENTS
pt seen  case discussed with mother  pt has 11-13 cm mass + possible fibroid  due to pain recommend surgery  risks: Infection, bleeding, transfusion, injury to other organs-bowel/bladder, dvt/pe, need for exlap

## 2021-05-21 NOTE — H&P ADULT - NSHPPHYSICALEXAM_GEN_ALL_CORE
Vital Signs Last 24 Hrs  T(C): 36 (21 May 2021 01:47), Max: 36 (21 May 2021 01:47)  T(F): 96.8 (21 May 2021 01:47), Max: 96.8 (21 May 2021 01:47)  HR: 82 (21 May 2021 01:47) (82 - 96)  BP: 100/52 (21 May 2021 01:47) (100/52 - 138/78)  RR: 18 (21 May 2021 01:47) (18 - 18)  SpO2: 100% (21 May 2021 01:47) (100% - 100%)    General Appearance - AAOx3, NAD  Heart - S1S2 regular rate and rhythm  Lung - CTA Bilaterally  Abdomen - Soft, mild RLQ tenderness to palpation, other quadrants nontender, nondistended, no rebound, no rigidity, no guarding, bowel sounds present

## 2021-05-21 NOTE — H&P ADULT - HISTORY OF PRESENT ILLNESS
HPI: 24yo G0, virginal, h/o intellectual delay, LMP: 5/9/21 presents to the ED with right sided abdominal pain that started at 1500. Patient was exercising and working and had acute onset of the right sided abdominal pain. Described as aching in nature, nonradiating. No remitting factors. Pressure on the area is the only exacerbating factor. Reports some mild discomfort with urination. Denies frequency or urgency. Patient has known h/o constipation, likely functional constipation, last BM today. Denies fever, chills, nausea, vomiting, diarrhea, dysuria, abnormal vaginal discharge. Patient has only seen a GYN once, several years ago. Has never had a pelvic exam or pap smear.     Last ate: 5/20 @1200  Last BM: this afternoon    Ob/Gyn History:  G0, virginal                 LMP - 5/9/21               Cycle Length - q28d  Denies history of ovarian cysts, uterine fibroids, abnormal paps, or STIs  Last Pap Smear - never done    OBHx: nulligravid

## 2021-05-21 NOTE — BRIEF OPERATIVE NOTE - NSICDXBRIEFPROCEDURE_GEN_ALL_CORE_FT
PROCEDURES:  Laparoscopy, diagnostic 21-May-2021 16:45:58  Avis Webster  Laparoscopic cystectomy of right ovary 21-May-2021 16:46:15  Avis Webster  Laparoscopic surgical removal of dermoid cyst of ovary 21-May-2021 16:57:06  Avis Webster

## 2021-05-21 NOTE — H&P ADULT - ASSESSMENT
26yo G0, virginal, LMP 5/9/21, with right sided abdominal pain now resolved, with 11.2cm right ovarian simple cyst, currently clinically and hemodynamically stable.    Discussed possibility of ovarian torsion-detorsion with the patient and mother. Discussed close follow-up and outpatient management with surgical planning vs inpatient management with diagnostic laparoscopy, possible ovarian cystectomy, possible salpingo-oopherectomy, possible myomectomy, possible exploratory laparotomy at this time. Risks, benefits and alternatives explained, all questions answered. At this time patient elected for inpatient surgical management.     -Admit to GYN service  -NPO  -IV Fluids  -T&S  -Covid swab    Dr. Ley and Dr. Noe aware

## 2021-05-22 ENCOUNTER — TRANSCRIPTION ENCOUNTER (OUTPATIENT)
Age: 26
End: 2021-05-22

## 2021-05-22 VITALS
TEMPERATURE: 97 F | RESPIRATION RATE: 18 BRPM | SYSTOLIC BLOOD PRESSURE: 102 MMHG | DIASTOLIC BLOOD PRESSURE: 51 MMHG | HEART RATE: 84 BPM

## 2021-05-22 LAB
AFP-TM SERPL-MCNC: <1.8 NG/ML — SIGNIFICANT CHANGE UP
ANION GAP SERPL CALC-SCNC: 9 MMOL/L — SIGNIFICANT CHANGE UP (ref 7–14)
BASOPHILS # BLD AUTO: 0.03 K/UL — SIGNIFICANT CHANGE UP (ref 0–0.2)
BASOPHILS NFR BLD AUTO: 0.3 % — SIGNIFICANT CHANGE UP (ref 0–1)
BUN SERPL-MCNC: 8 MG/DL — LOW (ref 10–20)
CALCIUM SERPL-MCNC: 9 MG/DL — SIGNIFICANT CHANGE UP (ref 8.5–10.1)
CANCER AG125 SERPL-ACNC: 11 U/ML — SIGNIFICANT CHANGE UP
CANCER AG19-9 SERPL-ACNC: 2 U/ML — SIGNIFICANT CHANGE UP
CEA SERPL-MCNC: 0.7 NG/ML — SIGNIFICANT CHANGE UP (ref 0–3.8)
CHLORIDE SERPL-SCNC: 104 MMOL/L — SIGNIFICANT CHANGE UP (ref 98–110)
CO2 SERPL-SCNC: 28 MMOL/L — SIGNIFICANT CHANGE UP (ref 17–32)
COVID-19 SPIKE DOMAIN AB INTERP: POSITIVE
COVID-19 SPIKE DOMAIN ANTIBODY RESULT: >250 U/ML — HIGH
CREAT SERPL-MCNC: 0.8 MG/DL — SIGNIFICANT CHANGE UP (ref 0.7–1.5)
EOSINOPHIL # BLD AUTO: 0.01 K/UL — SIGNIFICANT CHANGE UP (ref 0–0.7)
EOSINOPHIL NFR BLD AUTO: 0.1 % — SIGNIFICANT CHANGE UP (ref 0–8)
GLUCOSE SERPL-MCNC: 89 MG/DL — SIGNIFICANT CHANGE UP (ref 70–99)
HCG-TM SERPL-ACNC: <1 MIU/ML — SIGNIFICANT CHANGE UP
HCT VFR BLD CALC: 31.5 % — LOW (ref 37–47)
HGB BLD-MCNC: 9.5 G/DL — LOW (ref 12–16)
IMM GRANULOCYTES NFR BLD AUTO: 0.3 % — SIGNIFICANT CHANGE UP (ref 0.1–0.3)
LYMPHOCYTES # BLD AUTO: 1.95 K/UL — SIGNIFICANT CHANGE UP (ref 1.2–3.4)
LYMPHOCYTES # BLD AUTO: 21.8 % — SIGNIFICANT CHANGE UP (ref 20.5–51.1)
MAGNESIUM SERPL-MCNC: 2.2 MG/DL — SIGNIFICANT CHANGE UP (ref 1.8–2.4)
MCHC RBC-ENTMCNC: 22.3 PG — LOW (ref 27–31)
MCHC RBC-ENTMCNC: 30.2 G/DL — LOW (ref 32–37)
MCV RBC AUTO: 73.9 FL — LOW (ref 81–99)
MONOCYTES # BLD AUTO: 1.07 K/UL — HIGH (ref 0.1–0.6)
MONOCYTES NFR BLD AUTO: 11.9 % — HIGH (ref 1.7–9.3)
NEUTROPHILS # BLD AUTO: 5.87 K/UL — SIGNIFICANT CHANGE UP (ref 1.4–6.5)
NEUTROPHILS NFR BLD AUTO: 65.6 % — SIGNIFICANT CHANGE UP (ref 42.2–75.2)
NRBC # BLD: 0 /100 WBCS — SIGNIFICANT CHANGE UP (ref 0–0)
PHOSPHATE SERPL-MCNC: 2.9 MG/DL — SIGNIFICANT CHANGE UP (ref 2.1–4.9)
PLATELET # BLD AUTO: 178 K/UL — SIGNIFICANT CHANGE UP (ref 130–400)
POTASSIUM SERPL-MCNC: 4.2 MMOL/L — SIGNIFICANT CHANGE UP (ref 3.5–5)
POTASSIUM SERPL-SCNC: 4.2 MMOL/L — SIGNIFICANT CHANGE UP (ref 3.5–5)
RBC # BLD: 4.26 M/UL — SIGNIFICANT CHANGE UP (ref 4.2–5.4)
RBC # FLD: 17.2 % — HIGH (ref 11.5–14.5)
SARS-COV-2 IGG+IGM SERPL QL IA: >250 U/ML — HIGH
SARS-COV-2 IGG+IGM SERPL QL IA: POSITIVE
SODIUM SERPL-SCNC: 141 MMOL/L — SIGNIFICANT CHANGE UP (ref 135–146)
WBC # BLD: 8.96 K/UL — SIGNIFICANT CHANGE UP (ref 4.8–10.8)
WBC # FLD AUTO: 8.96 K/UL — SIGNIFICANT CHANGE UP (ref 4.8–10.8)

## 2021-05-22 PROCEDURE — 99238 HOSP IP/OBS DSCHRG MGMT 30/<: CPT

## 2021-05-22 RX ORDER — SIMETHICONE 80 MG/1
1 TABLET, CHEWABLE ORAL
Qty: 30 | Refills: 0
Start: 2021-05-22 | End: 2021-05-31

## 2021-05-22 RX ORDER — SODIUM CHLORIDE 9 MG/ML
500 INJECTION, SOLUTION INTRAVENOUS ONCE
Refills: 0 | Status: COMPLETED | OUTPATIENT
Start: 2021-05-22 | End: 2021-05-22

## 2021-05-22 RX ORDER — SODIUM CHLORIDE 9 MG/ML
1000 INJECTION, SOLUTION INTRAVENOUS
Refills: 0 | Status: DISCONTINUED | OUTPATIENT
Start: 2021-05-22 | End: 2021-05-22

## 2021-05-22 RX ORDER — DOCUSATE SODIUM 100 MG
1 CAPSULE ORAL
Qty: 20 | Refills: 0
Start: 2021-05-22 | End: 2021-05-31

## 2021-05-22 RX ORDER — IBUPROFEN 200 MG
1 TABLET ORAL
Qty: 40 | Refills: 0
Start: 2021-05-22 | End: 2021-05-31

## 2021-05-22 RX ORDER — OXYCODONE AND ACETAMINOPHEN 5; 325 MG/1; MG/1
1 TABLET ORAL
Qty: 10 | Refills: 0
Start: 2021-05-22

## 2021-05-22 RX ORDER — SODIUM CHLORIDE 9 MG/ML
1000 INJECTION, SOLUTION INTRAVENOUS ONCE
Refills: 0 | Status: COMPLETED | OUTPATIENT
Start: 2021-05-22 | End: 2021-05-22

## 2021-05-22 RX ADMIN — Medication 975 MILLIGRAM(S): at 07:29

## 2021-05-22 RX ADMIN — SODIUM CHLORIDE 125 MILLILITER(S): 9 INJECTION, SOLUTION INTRAVENOUS at 05:15

## 2021-05-22 RX ADMIN — Medication 975 MILLIGRAM(S): at 21:09

## 2021-05-22 RX ADMIN — Medication 600 MILLIGRAM(S): at 11:25

## 2021-05-22 RX ADMIN — Medication 975 MILLIGRAM(S): at 14:18

## 2021-05-22 RX ADMIN — Medication 975 MILLIGRAM(S): at 14:20

## 2021-05-22 RX ADMIN — Medication 100 MILLIGRAM(S): at 01:59

## 2021-05-22 RX ADMIN — SODIUM CHLORIDE 1000 MILLILITER(S): 9 INJECTION, SOLUTION INTRAVENOUS at 18:13

## 2021-05-22 RX ADMIN — SODIUM CHLORIDE 500 MILLILITER(S): 9 INJECTION, SOLUTION INTRAVENOUS at 14:48

## 2021-05-22 NOTE — PROGRESS NOTE ADULT - SUBJECTIVE AND OBJECTIVE BOX
PGY2 Note:    Patient seen and examined. Pain well controlled at this time. No complaints at this time. Denies fever, chills, nausea, vomiting, chest pain, shortness of breath, severe abdominal pain, heavy vaginal bleeding. Is ambulating to chair and tolerating PO.  Not yet passing flatus. Overnight failed trial of void, boyle replaced @0515 and drained 150cc of urine.     Physical Exam:  Vital Signs:  T(C): 36.3 (05-22-21 @ 04:00), Max: 37.1 (05-21-21 @ 14:45)  HR: 86 (05-22-21 @ 04:00) (73 - 86)  BP: 123/56 (05-22-21 @ 04:00) (98/52 - 138/65)  RR: 18 (05-22-21 @ 04:00) (14 - 22)  SpO2: 98% (05-21-21 @ 21:00) (97% - 100%)    UO: (3822-3425) 100cc      Gen: NAD, A&Ox3  Heart: S1S2,RRR  Lungs: CTABL  Abd: ND, soft, NT, BS+; dressings removed, laparoscopic incisions c/d/i with steri strips in place  VE: Deferred, no active bleeding  Ext: SCDs, no edema or calf tenderness bilaterally    Labs:  5/20: 9.09>10.3/33.2<197, 138/4.5/103/24/8/0.7<117, AST/ALT: 13/11, UA: moderate ketones, Apos    5/21:  (hemolyzed)  @2000 10.88>10.1/32.1<188, 138/4.5/104/24/10/6/0.6<122, Mg 1.9, phos 2.8      Medications:  acetaminophen   Tablet .. 975 milliGRAM(s) Oral every 8 hours  ibuprofen  Tablet. 600 milliGRAM(s) Oral every 6 hours  lactated ringers. 1000 milliLiter(s) IV Continuous <Continuous>  ondansetron Injectable 4 milliGRAM(s) IV Push every 4 hours PRN  oxyCODONE    IR 10 milliGRAM(s) Oral every 4 hours PRN  simethicone 80 milliGRAM(s) Chew every 4 hours

## 2021-05-22 NOTE — DISCHARGE NOTE PROVIDER - HOSPITAL COURSE
s/p right ovarian cystectomy & removal of dermoid attached to rectum, POD#1.  Ambulating, tolerating regular diet, passing flatus, voiding.  To be discharged in stable condition.

## 2021-05-22 NOTE — PROGRESS NOTE ADULT - ASSESSMENT
8/29/2019         RE: Audie Harris  [id number] [facility] [address]        Dear Colleague,    Thank you for referring your patient, Audie Harris, to the [facility]. Please see a copy of my visit note below.    DERMATOLOGIC EXCISION SURGERY PROCEDURE NOTE     [facility]   [address], [place] [address]     NAME OF PROCEDURE: Excision with complex linear closure  Staff surgeon: John Carr DO  Resident: Jose Muñoz MD   PRE-OPERATIVE DIAGNOSIS:  Basal cell carcinoma, nodular type   POST-OPERATIVE DIAGNOSIS: Same   FINAL EXCISION SIZE(EXCISION DEFECT SIZE): 1.4 x 1.2 cm, with 4 mm margin   FINAL REPAIR LENGTH: 3.6 cm   INDICATIONS: This patient presented with a 0.6 x 0.4 cm basal cell carcinoma of the left upper back. Excision was indicated.   We discussed the principles of treatment and most likely complications including bleeding, infection, scarring, alteration in skin color and sensation, wound dehiscence,muscle weakness in the area, or recurrence of the lesion or disease. We reviewed that on occasion, after healing, a secondary procedure or revision may be recommended in order to obtain the best cosmetic or functional result.     PROCEDURE: The patient was taken to the operative suite. Time-out was performed. The treatment area was anesthetized with 1% lidocaine and epinephrine (1:100,000). The area was washed with Hibiclens, rinsed with saline and draped with sterile towels. The lesion was delineated and excised down to deep subcutaneous fat in an elliptical manner. Hemostasis was obtained by electrocoagulation.     REPAIR: A complex layered linear closure was selected as the procedure which would maximally preserve both function and cosmesis and for the following reasons: 1) the defect was widely undermined; 2) multiple deep plication and layered sutures placed; 3) wound size, depth, tension, and location.   After  the excision of the tumor, the area was extensively and carefully undermined using blunt Metzenbaum scissors. Hemostasis was obtained with spot electrocautery and ligation of vessels where necessary. An initial deep plication sutures of 3-0 Vicryl sutures  were placed in the deep, subcutaneous and fascial planes to appose the lateral margins.  The subcutaneous and dermal layers were then closed with additional 3-0 Vicryl sutures. The epidermis was then carefully approximated along the length of the wound using 4-0 Monocryl running subcuticular sutures.   The final wound length was 3.6 cm. A total of 10.0 ml of anesthesia was administered for all surgical sites. Estimated blood loss was less than 10 ml for all surgical sites. A sterile pressure dressing was applied and wound care instructions, with a written handout, were given. The [facility] was [facility] alert and ambulatory.    Follow up for wound evaluation as needed.       -----------------------------------------------------------------------------------------------------------    DERMATOLOGIC EXCISION SURGERY PROCEDURE NOTE     [facility]   [address] [place] [address]     NAME OF PROCEDURE: Excision with complex linear closure  Staff surgeon: John Carr DO  Resident: Jose Muñoz MD   PRE-OPERATIVE DIAGNOSIS:  Basal cell carcinoma, superficial and nodular type   POST-OPERATIVE DIAGNOSIS: Same   FINAL EXCISION SIZE(EXCISION DEFECT SIZE): 1.4 x 1.2 cm, with 4 mm margin   FINAL REPAIR LENGTH: 4.8 cm   INDICATIONS: This patient presented with a 0.6 x 0.4 cm basal cell carcinoma of the central upper back. Excision was indicated.   We discussed the principles of treatment and most likely complications including bleeding, infection, scarring, alteration in skin color and sensation, wound dehiscence,muscle weakness in the area, or recurrence of the lesion or disease. We  reviewed that on occasion, after healing, a secondary procedure or revision may be recommended in order to obtain the best cosmetic or functional result.     PROCEDURE: The patient was taken to the operative suite. Time-out was performed. The treatment area was anesthetized with 1% lidocaine and epinephrine (1:100,000). The area was washed with Hibiclens, rinsed with saline and draped with sterile towels. The lesion was delineated and excised down to deep subcutaneous fat in an elliptical manner. Hemostasis was obtained by electrocoagulation.     REPAIR: A complex layered linear closure was selected as the procedure which would maximally preserve both function and cosmesis and for the following reasons: 1) the defect was widely undermined; 2) multiple deep plication and layered sutures placed; 3) wound size, depth, tension, and location.   After the excision of the tumor, the area was extensively and carefully undermined using blunt Metzenbaum scissors. Hemostasis was obtained with spot electrocautery and ligation of vessels where necessary. An initial deep plication sutures of 3-0 Vicryl sutures  were placed in the deep, subcutaneous and fascial planes to appose the lateral margins.  The subcutaneous and dermal layers were then closed with additional 3-0 Vicryl sutures. The epidermis was then carefully approximated along the length of the wound using 4-0 Monocryl running subcuticular sutures.   The final wound length was 4.8 cm. A total of 10.0 ml of anesthesia was administered for all surgical sites. Estimated blood loss was less than 10 ml for all surgical sites. A sterile pressure dressing was applied and wound care instructions, with a written handout, were given. The patient was discharged from the [facility] alert and ambulatory.    Follow up as needed for wound evaluation.         -----------------------------------------------------------------------------------------------------------    Dermatology Procedure Note: Electrodesiccation and Curettage    PREOPERATIVE DIAGNOSIS: Basal cell carcinoma, superficial type     POSTOPERATIVE DIAGNOSIS: same    LOCATION: left mid back     SIZE: 0.5 x 0.5 cm     Treatment options including electrodessiccation and curettage (ED and C), excision and topicals were reviewed.  The expected cure rates, healing times and anticipated scars of each option were discussed and the patient elects to proceed with ED and C.     The risks and benefits of the procedure were described to the patient.  These include but are not limited to bleeding, infection, scar, incomplete removal, and recurrence. Written informed consent was obtained. Time-out was performed. The above site was cleansed with and injected with 2.5 mL 1% lidocaine with epinephrine. Once anesthesia was obtained, the site was prepped with Chlorhexidine and rinsed with sterile saline. The lesion was curetted in 3 directions with a 3 mm margin and this was followed by electrodessication.  This process was repeated three times. The defect measured 1.1 x 1.1 cm. Vaseline and a bandage were applied to the wound. The patient tolerated the procedure well and was given post care instructions.    Follow up for wound check as needed.       Staff Physician Comments:   I saw and evaluated the patient with the resident (Dr. Jose Muñoz) and I agree with the assessment and plan as above. I was present for the entire procedure and examination.    John Carr DO    Department of Dermatology  [facility]: Phone: [phone], Fax:[phone]  [facility]: Phone: [phone], Fax: [phone]    Again, thank you for allowing me to participate in the care of your patient.         Sincerely,        John Carr MD     A/P: 24yo G0, virginal, POD#1 s/p laparoscopic right ovarian cystectomy & removal of dermoid attached to rectum, EBL 100cc; currently recovering well  -regular diet  -DVT ppx: SCDs  -f/u UO, output currently adequate, boyle until @1700 tonight  -continue IVF hydration   -PO hydration encouraged  -vitals q4h  -ambulation encouraged  -incentive spirometry use encouraged  -f/u AM labs  -f/u Inhibin A+B , CA 19-9, CEA, Ca 125, AFP, HCG      Dr. Torres and GYN attending to be made aware. A/P: 26yo G0, virginal, POD#1 s/p laparoscopic right ovarian cystectomy & removal of dermoid attached to rectum, EBL 100cc; currently recovering well  -regular diet  -DVT ppx: SCDs  -f/u UO, output currently adequate, boyle until @1200 today  -continue IVF hydration   -PO hydration encouraged  -vitals q4h  -ambulation encouraged  -incentive spirometry use encouraged  -f/u AM labs  -f/u Inhibin A+B , CA 19-9, CEA, Ca 125, AFP, HCG      Dr. Torres and GYN attending to be made aware.

## 2021-05-22 NOTE — DISCHARGE NOTE PROVIDER - NSDCFUADDINST_GEN_ALL_CORE_FT
Nothing in the vagina for 2 weeks (no sex, no tampons, no douching). Avoid tub baths, you may shower.  If you have a fever of 100.4F or greater, severe vaginal bleeding, or severe abdominal pain, call your Ob/Gyn or come to the emergency department immediately.  Please follow up with your provider in 2weeks for postop visit.    Do not remove steri strips, they will fall off on their own or be removed by your provider.  Pain medication was sent to your pharmacy.

## 2021-05-22 NOTE — DISCHARGE NOTE PROVIDER - NSDCCPCAREPLAN_GEN_ALL_CORE_FT
PRINCIPAL DISCHARGE DIAGNOSIS  Diagnosis: Ovarian cyst  Assessment and Plan of Treatment:       SECONDARY DISCHARGE DIAGNOSES  Diagnosis: Abdominal pain  Assessment and Plan of Treatment:

## 2021-05-22 NOTE — DISCHARGE NOTE PROVIDER - CARE PROVIDER_API CALL
Marlen Moody)  OBSGYN  Physicians  [address] [place] [address]  Phone: [phone]  Fax: [phone]  Follow Up Time: 2 weeks

## 2021-05-22 NOTE — DISCHARGE NOTE PROVIDER - NSDCMRMEDTOKEN_GEN_ALL_CORE_FT
Colace 100 mg oral capsule: 1 cap(s) orally 2 times a day    mg oral tablet: 1 tab(s) orally every 6 hours x 10 days   Percocet 5 mg-325 mg oral tablet: 1 tab(s) orally every 6 hours MDD:4  simethicone 80 mg oral tablet: 1 tab(s) orally 3 times a day (after meals)

## 2021-05-22 NOTE — DISCHARGE NOTE NURSING/CASE MANAGEMENT/SOCIAL WORK - PATIENT PORTAL LINK FT
You can access the FollowMyHealth Patient Portal offered by [facility] by registering at the following website: [email or web address]. By joining [facility]’s FollowMyHealth portal, you will also be able to view your health information using other applications (apps) compatible with our system.

## 2021-05-24 LAB
NON-GYNECOLOGICAL CYTOLOGY STUDY: SIGNIFICANT CHANGE UP
NON-GYNECOLOGICAL CYTOLOGY STUDY: SIGNIFICANT CHANGE UP

## 2021-05-25 LAB
INHIBIN A SERPL-MCNC: 23.1 PG/ML — SIGNIFICANT CHANGE UP
INHIBIN B SERUM: 85.6 PG/ML — SIGNIFICANT CHANGE UP

## 2021-05-27 DIAGNOSIS — N83.201 UNSPECIFIED OVARIAN CYST, RIGHT SIDE: ICD-10-CM

## 2021-05-27 DIAGNOSIS — K66.0 PERITONEAL ADHESIONS (POSTPROCEDURAL) (POSTINFECTION): ICD-10-CM

## 2021-05-27 DIAGNOSIS — Z90.79 ACQUIRED ABSENCE OF OTHER GENITAL ORGAN(S): ICD-10-CM

## 2021-05-27 DIAGNOSIS — D20.0 BENIGN NEOPLASM OF SOFT TISSUE OF RETROPERITONEUM: ICD-10-CM

## 2021-05-27 DIAGNOSIS — F79 UNSPECIFIED INTELLECTUAL DISABILITIES: ICD-10-CM

## 2021-06-03 ENCOUNTER — APPOINTMENT (OUTPATIENT)
Dept: OBGYN | Facility: CLINIC | Age: 26
End: 2021-06-03
Payer: MEDICAID

## 2021-06-03 ENCOUNTER — OUTPATIENT (OUTPATIENT)
Dept: OUTPATIENT SERVICES | Facility: HOSPITAL | Age: 26
LOS: 1 days | Discharge: HOME | End: 2021-06-03

## 2021-06-03 VITALS
HEIGHT: 65 IN | SYSTOLIC BLOOD PRESSURE: 114 MMHG | WEIGHT: 180 LBS | BODY MASS INDEX: 29.99 KG/M2 | DIASTOLIC BLOOD PRESSURE: 78 MMHG

## 2021-06-03 DIAGNOSIS — Z90.89 ACQUIRED ABSENCE OF OTHER ORGANS: Chronic | ICD-10-CM

## 2021-06-03 DIAGNOSIS — N83.209 UNSPECIFIED OVARIAN CYST, UNSPECIFIED SIDE: ICD-10-CM

## 2021-06-03 PROBLEM — Z78.9 OTHER SPECIFIED HEALTH STATUS: Chronic | Status: ACTIVE | Noted: 2021-05-21

## 2021-06-03 PROBLEM — Z00.00 ENCOUNTER FOR PREVENTIVE HEALTH EXAMINATION: Status: ACTIVE | Noted: 2021-06-03

## 2021-06-03 PROCEDURE — 99024 POSTOP FOLLOW-UP VISIT: CPT

## 2021-06-11 DIAGNOSIS — N83.201 UNSPECIFIED OVARIAN CYST, RIGHT SIDE: ICD-10-CM

## 2021-06-11 DIAGNOSIS — Z09 ENCOUNTER FOR FOLLOW-UP EXAMINATION AFTER COMPLETED TREATMENT FOR CONDITIONS OTHER THAN MALIGNANT NEOPLASM: ICD-10-CM

## 2021-06-26 ENCOUNTER — OUTPATIENT (OUTPATIENT)
Dept: OUTPATIENT SERVICES | Facility: HOSPITAL | Age: 26
LOS: 1 days | Discharge: HOME | End: 2021-06-26
Payer: MEDICAID

## 2021-06-26 DIAGNOSIS — N83.209 UNSPECIFIED OVARIAN CYST, UNSPECIFIED SIDE: ICD-10-CM

## 2021-06-26 DIAGNOSIS — Z90.89 ACQUIRED ABSENCE OF OTHER ORGANS: Chronic | ICD-10-CM

## 2021-06-26 PROCEDURE — 76856 US EXAM PELVIC COMPLETE: CPT | Mod: 26

## 2021-07-27 PROBLEM — N83.201 CYST OF RIGHT OVARY: Status: ACTIVE | Noted: 2021-07-27

## 2021-07-27 PROBLEM — Z09 POSTOP CHECK: Status: RESOLVED | Noted: 2021-06-03 | Resolved: 2021-07-27

## 2021-08-13 NOTE — ED ADULT NURSE NOTE - ALCOHOL PRE SCREEN (AUDIT - C)
Pt contacted. Pt reported INR of 4.4 -fingerstick at home. See anti-coag flow sheet. Statement Selected

## 2021-09-07 ENCOUNTER — APPOINTMENT (OUTPATIENT)
Dept: OBGYN | Facility: CLINIC | Age: 26
End: 2021-09-07

## 2022-02-15 ENCOUNTER — APPOINTMENT (OUTPATIENT)
Dept: OBGYN | Facility: CLINIC | Age: 27
End: 2022-02-15
